# Patient Record
Sex: FEMALE | Race: WHITE | NOT HISPANIC OR LATINO | URBAN - METROPOLITAN AREA
[De-identification: names, ages, dates, MRNs, and addresses within clinical notes are randomized per-mention and may not be internally consistent; named-entity substitution may affect disease eponyms.]

---

## 2023-07-06 ENCOUNTER — OFFICE VISIT (OUTPATIENT)
Dept: PRIMARY CARE | Facility: CLINIC | Age: 43
End: 2023-07-06
Payer: COMMERCIAL

## 2023-07-06 VITALS
HEIGHT: 66 IN | BODY MASS INDEX: 30.02 KG/M2 | WEIGHT: 186.8 LBS | TEMPERATURE: 97.7 F | SYSTOLIC BLOOD PRESSURE: 110 MMHG | HEART RATE: 74 BPM | DIASTOLIC BLOOD PRESSURE: 74 MMHG

## 2023-07-06 DIAGNOSIS — S90.829D BLISTER OF FOOT, UNSPECIFIED LATERALITY, SUBSEQUENT ENCOUNTER: ICD-10-CM

## 2023-07-06 DIAGNOSIS — M72.2 PLANTAR FASCIITIS, BILATERAL: ICD-10-CM

## 2023-07-06 DIAGNOSIS — Q82.8: Primary | ICD-10-CM

## 2023-07-06 PROCEDURE — 99213 OFFICE O/P EST LOW 20 MIN: CPT | Performed by: STUDENT IN AN ORGANIZED HEALTH CARE EDUCATION/TRAINING PROGRAM

## 2023-07-06 PROCEDURE — 1036F TOBACCO NON-USER: CPT | Performed by: STUDENT IN AN ORGANIZED HEALTH CARE EDUCATION/TRAINING PROGRAM

## 2023-07-06 RX ORDER — HYDROXYCHLOROQUINE SULFATE 200 MG/1
200 TABLET, FILM COATED ORAL 2 TIMES DAILY
COMMUNITY
Start: 2019-04-11

## 2023-07-06 RX ORDER — MULTIVITAMIN
1 TABLET ORAL DAILY
COMMUNITY
Start: 2023-05-01

## 2023-07-06 RX ORDER — BACITRACIN ZINC 500 UNIT/G
OINTMENT (GRAM) TOPICAL 2 TIMES DAILY
Qty: 113 G | Refills: 2 | Status: SHIPPED | OUTPATIENT
Start: 2023-07-06

## 2023-07-06 RX ORDER — VIT C/E/ZN/COPPR/LUTEIN/ZEAXAN 250MG-90MG
1 CAPSULE ORAL DAILY
COMMUNITY
Start: 2023-01-03

## 2023-07-06 ASSESSMENT — PATIENT HEALTH QUESTIONNAIRE - PHQ9
2. FEELING DOWN, DEPRESSED OR HOPELESS: NOT AT ALL
1. LITTLE INTEREST OR PLEASURE IN DOING THINGS: NOT AT ALL
SUM OF ALL RESPONSES TO PHQ9 QUESTIONS 1 AND 2: 0

## 2023-07-06 ASSESSMENT — ENCOUNTER SYMPTOMS
DIZZINESS: 0
DYSURIA: 0
VOMITING: 0
HEADACHES: 0
FATIGUE: 0
ABDOMINAL PAIN: 0
CHILLS: 0
WOUND: 1
LIGHT-HEADEDNESS: 0
ARTHRALGIAS: 0
CONSTIPATION: 0
FREQUENCY: 0
MYALGIAS: 0
SHORTNESS OF BREATH: 0
RHINORRHEA: 0
NAUSEA: 0
DIARRHEA: 0
FEVER: 0

## 2023-07-06 NOTE — PROGRESS NOTES
"Subjective   Patient ID: Gertrudis Kingsley is a 42 y.o. female who presents for blisters on feet.    HPI     She has Unna-Thost syndrome.  She gets blisters around the calluses on her hands and feet.  She has had areas on the feet with blisters over the last week.  She did pop them but over the next few days they have gotten more painful and more of the blisters formed.  She is concerned about infection.    Review of Systems   Constitutional:  Negative for chills, fatigue and fever.   HENT:  Negative for congestion and rhinorrhea.    Respiratory:  Negative for shortness of breath.    Cardiovascular:  Negative for chest pain.   Gastrointestinal:  Negative for abdominal pain, constipation, diarrhea, nausea and vomiting.   Genitourinary:  Negative for dysuria and frequency.   Musculoskeletal:  Negative for arthralgias and myalgias.   Skin:  Positive for wound (blisters on both feet).   Neurological:  Negative for dizziness, light-headedness and headaches.       Objective   /74   Pulse 74   Temp 36.5 °C (97.7 °F)   Ht 1.67 m (5' 5.75\")   Wt 84.7 kg (186 lb 12.8 oz)   BMI 30.38 kg/m²     Physical Exam  Vitals and nursing note reviewed.   Constitutional:       General: She is not in acute distress.     Appearance: Normal appearance. She is obese. She is not ill-appearing or toxic-appearing.   Cardiovascular:      Rate and Rhythm: Normal rate and regular rhythm.      Heart sounds: Normal heart sounds.   Pulmonary:      Effort: Pulmonary effort is normal.      Breath sounds: Normal breath sounds.   Skin:     General: Skin is warm and dry.      Findings: Lesion (blisters on bilateral feet) present. No erythema.      Comments: No surrounding erythema.  No discharge or drainage or other signs of infection.   Neurological:      Mental Status: She is alert.         Assessment/Plan   Problem List Items Addressed This Visit    None  Visit Diagnoses       Unna-Thost syndrome    -  Primary    Relevant Medications    " bacitracin 500 unit/gram ointment    Blister of foot, unspecified laterality, subsequent encounter        Relevant Medications    bacitracin 500 unit/gram ointment    Plantar fasciitis, bilateral              History and physical examination as above.  No signs of infection around the areas of blistering.  Did prescribe bacitracin ointment to put over areas that are open to prevent them from getting infected.  Discussed signs and symptoms to watch for that would indicate need for additional treatment.  She is understanding and agreeable with this plan.

## 2024-01-23 ENCOUNTER — TELEPHONE (OUTPATIENT)
Dept: PRIMARY CARE | Facility: CLINIC | Age: 44
End: 2024-01-23
Payer: COMMERCIAL

## 2024-01-23 DIAGNOSIS — R53.83 OTHER FATIGUE: ICD-10-CM

## 2024-01-23 DIAGNOSIS — Z00.00 HEALTHCARE MAINTENANCE: ICD-10-CM

## 2024-01-23 NOTE — TELEPHONE ENCOUNTER
Pt coming in on 02/26/24 for annual physical  BW?    PT will go downstairs for BW.    Pt number [phone]

## 2024-02-07 ENCOUNTER — APPOINTMENT (OUTPATIENT)
Dept: LAB | Facility: LAB | Age: 44
End: 2024-02-07
Payer: COMMERCIAL

## 2024-02-07 ENCOUNTER — LAB (OUTPATIENT)
Dept: LAB | Facility: LAB | Age: 44
End: 2024-02-07
Payer: COMMERCIAL

## 2024-02-07 DIAGNOSIS — R53.83 OTHER FATIGUE: ICD-10-CM

## 2024-02-07 DIAGNOSIS — Z00.00 HEALTHCARE MAINTENANCE: ICD-10-CM

## 2024-02-07 PROCEDURE — 84443 ASSAY THYROID STIM HORMONE: CPT

## 2024-02-07 PROCEDURE — 80053 COMPREHEN METABOLIC PANEL: CPT

## 2024-02-07 PROCEDURE — 36415 COLL VENOUS BLD VENIPUNCTURE: CPT

## 2024-02-07 PROCEDURE — 85025 COMPLETE CBC W/AUTO DIFF WBC: CPT

## 2024-02-07 PROCEDURE — 80061 LIPID PANEL: CPT

## 2024-02-07 PROCEDURE — 82306 VITAMIN D 25 HYDROXY: CPT

## 2024-02-08 LAB
25(OH)D3 SERPL-MCNC: 60 NG/ML (ref 30–100)
ALBUMIN SERPL BCP-MCNC: 4.1 G/DL (ref 3.4–5)
ALP SERPL-CCNC: 39 U/L (ref 33–110)
ALT SERPL W P-5'-P-CCNC: 12 U/L (ref 7–45)
ANION GAP SERPL CALC-SCNC: 10 MMOL/L (ref 10–20)
AST SERPL W P-5'-P-CCNC: 38 U/L (ref 9–39)
BASOPHILS # BLD AUTO: 0.04 X10*3/UL (ref 0–0.1)
BASOPHILS NFR BLD AUTO: 0.6 %
BILIRUB SERPL-MCNC: 0.5 MG/DL (ref 0–1.2)
BUN SERPL-MCNC: 8 MG/DL (ref 6–23)
CALCIUM SERPL-MCNC: 9.2 MG/DL (ref 8.6–10.6)
CHLORIDE SERPL-SCNC: 104 MMOL/L (ref 98–107)
CHOLEST SERPL-MCNC: 162 MG/DL (ref 0–199)
CHOLESTEROL/HDL RATIO: 3.3
CO2 SERPL-SCNC: 28 MMOL/L (ref 21–32)
CREAT SERPL-MCNC: 0.68 MG/DL (ref 0.5–1.05)
EGFRCR SERPLBLD CKD-EPI 2021: >90 ML/MIN/1.73M*2
EOSINOPHIL # BLD AUTO: 0.09 X10*3/UL (ref 0–0.7)
EOSINOPHIL NFR BLD AUTO: 1.3 %
ERYTHROCYTE [DISTWIDTH] IN BLOOD BY AUTOMATED COUNT: 12.9 % (ref 11.5–14.5)
GLUCOSE SERPL-MCNC: 79 MG/DL (ref 74–99)
HCT VFR BLD AUTO: 39.3 % (ref 36–46)
HDLC SERPL-MCNC: 48.7 MG/DL
HGB BLD-MCNC: 12.2 G/DL (ref 12–16)
IMM GRANULOCYTES # BLD AUTO: 0.01 X10*3/UL (ref 0–0.7)
IMM GRANULOCYTES NFR BLD AUTO: 0.1 % (ref 0–0.9)
LDLC SERPL CALC-MCNC: 93 MG/DL
LYMPHOCYTES # BLD AUTO: 3.27 X10*3/UL (ref 1.2–4.8)
LYMPHOCYTES NFR BLD AUTO: 45.9 %
MCH RBC QN AUTO: 29.8 PG (ref 26–34)
MCHC RBC AUTO-ENTMCNC: 31 G/DL (ref 32–36)
MCV RBC AUTO: 96 FL (ref 80–100)
MONOCYTES # BLD AUTO: 0.34 X10*3/UL (ref 0.1–1)
MONOCYTES NFR BLD AUTO: 4.8 %
NEUTROPHILS # BLD AUTO: 3.38 X10*3/UL (ref 1.2–7.7)
NEUTROPHILS NFR BLD AUTO: 47.3 %
NON HDL CHOLESTEROL: 113 MG/DL (ref 0–149)
NRBC BLD-RTO: 0 /100 WBCS (ref 0–0)
PLATELET # BLD AUTO: 232 X10*3/UL (ref 150–450)
POTASSIUM SERPL-SCNC: 5.3 MMOL/L (ref 3.5–5.3)
PROT SERPL-MCNC: 6.9 G/DL (ref 6.4–8.2)
RBC # BLD AUTO: 4.1 X10*6/UL (ref 4–5.2)
SODIUM SERPL-SCNC: 137 MMOL/L (ref 136–145)
TRIGL SERPL-MCNC: 103 MG/DL (ref 0–149)
TSH SERPL-ACNC: 1.45 MIU/L (ref 0.44–3.98)
VLDL: 21 MG/DL (ref 0–40)
WBC # BLD AUTO: 7.1 X10*3/UL (ref 4.4–11.3)

## 2024-02-26 ENCOUNTER — OFFICE VISIT (OUTPATIENT)
Dept: PRIMARY CARE | Facility: CLINIC | Age: 44
End: 2024-02-26
Payer: COMMERCIAL

## 2024-02-26 VITALS
BODY MASS INDEX: 28.93 KG/M2 | HEIGHT: 66 IN | WEIGHT: 180 LBS | OXYGEN SATURATION: 97 % | SYSTOLIC BLOOD PRESSURE: 99 MMHG | DIASTOLIC BLOOD PRESSURE: 66 MMHG | HEART RATE: 68 BPM | TEMPERATURE: 98.1 F

## 2024-02-26 DIAGNOSIS — Z00.00 HEALTHCARE MAINTENANCE: Primary | ICD-10-CM

## 2024-02-26 DIAGNOSIS — I51.89 DIASTOLIC DYSFUNCTION: ICD-10-CM

## 2024-02-26 DIAGNOSIS — D69.3 CHRONIC ITP (IDIOPATHIC THROMBOCYTOPENIA) (MULTI): ICD-10-CM

## 2024-02-26 PROBLEM — R20.2 PARESTHESIA: Status: ACTIVE | Noted: 2024-02-26

## 2024-02-26 PROBLEM — G62.9 NEUROPATHY: Status: ACTIVE | Noted: 2024-02-26

## 2024-02-26 PROBLEM — S90.829A BLISTER OF FOOT: Status: ACTIVE | Noted: 2024-02-26

## 2024-02-26 PROBLEM — M35.9 UNDIFFERENTIATED CONNECTIVE TISSUE DISEASE (MULTI): Status: ACTIVE | Noted: 2019-03-13

## 2024-02-26 PROBLEM — R42 DIZZINESS: Status: ACTIVE | Noted: 2024-02-26

## 2024-02-26 PROBLEM — O09.529 ANTEPARTUM MULTIGRAVIDA OF ADVANCED MATERNAL AGE (HHS-HCC): Status: ACTIVE | Noted: 2020-11-26

## 2024-02-26 PROBLEM — O09.40: Status: ACTIVE | Noted: 2020-11-26

## 2024-02-26 PROBLEM — M72.2 PLANTAR FASCIITIS, BILATERAL: Status: ACTIVE | Noted: 2024-02-26

## 2024-02-26 PROBLEM — O46.93 VAGINAL BLEEDING IN PREGNANCY, THIRD TRIMESTER (HHS-HCC): Status: ACTIVE | Noted: 2021-05-06

## 2024-02-26 PROBLEM — H61.20 CERUMEN IMPACTION: Status: ACTIVE | Noted: 2024-02-26

## 2024-02-26 PROBLEM — N61.0 MASTITIS: Status: ACTIVE | Noted: 2024-02-26

## 2024-02-26 PROBLEM — O00.109 ECTOPIC PREGNANCY, TUBAL (HHS-HCC): Status: ACTIVE | Noted: 2021-05-06

## 2024-02-26 PROBLEM — Z86.2 HISTORY OF IMMUNE THROMBOCYTOPENIA: Status: ACTIVE | Noted: 2024-02-26

## 2024-02-26 PROCEDURE — 1036F TOBACCO NON-USER: CPT | Performed by: FAMILY MEDICINE

## 2024-02-26 PROCEDURE — 99396 PREV VISIT EST AGE 40-64: CPT | Performed by: FAMILY MEDICINE

## 2024-02-26 ASSESSMENT — ENCOUNTER SYMPTOMS
COUGH: 0
FEVER: 0
PALPITATIONS: 0
FATIGUE: 0
ACTIVITY CHANGE: 0
APPETITE CHANGE: 0
FACIAL ASYMMETRY: 0
COLOR CHANGE: 0
CHEST TIGHTNESS: 0
LIGHT-HEADEDNESS: 0
DIZZINESS: 0
BACK PAIN: 0
CHOKING: 0
ARTHRALGIAS: 0
HEADACHES: 0

## 2024-02-26 ASSESSMENT — PATIENT HEALTH QUESTIONNAIRE - PHQ9
1. LITTLE INTEREST OR PLEASURE IN DOING THINGS: NOT AT ALL
SUM OF ALL RESPONSES TO PHQ9 QUESTIONS 1 AND 2: 0
2. FEELING DOWN, DEPRESSED OR HOPELESS: NOT AT ALL

## 2024-02-26 NOTE — PROGRESS NOTES
"Subjective   Patient ID: Gertrudis Kingsley is a 43 y.o. female who presents for Annual Exam.    HPI   Patient presents for physical exam.     Fam Hx  Mom ()  Dad ()     Exercise walks, bike rides in summer  ETOH denies  Caffeine denies  Tobacco denies     ob/gyn dr. black, NP at [facility]     Mammogram due 2024  DEXA @ 67  Colonoscopy @ 47     Patient denies other complaints.   Review of Systems   Constitutional:  Negative for activity change, appetite change, fatigue and fever.   HENT:  Negative for congestion.    Respiratory:  Negative for cough, choking and chest tightness.    Cardiovascular:  Negative for chest pain, palpitations and leg swelling.   Musculoskeletal:  Negative for arthralgias, back pain and gait problem.   Skin:  Negative for color change and pallor.   Neurological:  Negative for dizziness, facial asymmetry, light-headedness and headaches.       Objective   BP 99/66   Pulse 68   Temp 36.7 °C (98.1 °F)   Ht 1.676 m (5' 6\")   Wt 81.6 kg (180 lb)   SpO2 97%   BMI 29.05 kg/m²   BSA Body surface area is 1.95 meters squared.      Physical Exam  Constitutional:       General: She is not in acute distress.     Appearance: Normal appearance. She is not toxic-appearing.   HENT:      Head: Normocephalic.      Right Ear: Tympanic membrane, ear canal and external ear normal.      Left Ear: Tympanic membrane, ear canal and external ear normal.   Eyes:      Conjunctiva/sclera: Conjunctivae normal.      Pupils: Pupils are equal, round, and reactive to light.   Cardiovascular:      Rate and Rhythm: Normal rate and regular rhythm.      Pulses: Normal pulses.      Heart sounds: Normal heart sounds.   Pulmonary:      Effort: No respiratory distress.      Breath sounds: No wheezing, rhonchi or rales.   Abdominal:      General: Bowel sounds are normal. There is no distension.      Palpations: Abdomen is soft.      Tenderness: There is no abdominal tenderness.   Musculoskeletal:         General: No swelling or " tenderness.   Skin:     Findings: No lesion or rash.   Neurological:      General: No focal deficit present.      Mental Status: She is alert and oriented to person, place, and time. Mental status is at baseline.      Gait: Gait normal.   Psychiatric:         Mood and Affect: Mood normal.         Behavior: Behavior normal.         Thought Content: Thought content normal.         Judgment: Judgment normal.       Lab on 02/07/2024   Component Date Value Ref Range Status    WBC 02/07/2024 7.1  4.4 - 11.3 x10*3/uL Final    nRBC 02/07/2024 0.0  0.0 - 0.0 /100 WBCs Final    RBC 02/07/2024 4.10  4.00 - 5.20 x10*6/uL Final    Hemoglobin 02/07/2024 12.2  12.0 - 16.0 g/dL Final    Hematocrit 02/07/2024 39.3  36.0 - 46.0 % Final    MCV 02/07/2024 96  80 - 100 fL Final    MCH 02/07/2024 29.8  26.0 - 34.0 pg Final    MCHC 02/07/2024 31.0 (L)  32.0 - 36.0 g/dL Final    RDW 02/07/2024 12.9  11.5 - 14.5 % Final    Platelets 02/07/2024 232  150 - 450 x10*3/uL Final    Neutrophils % 02/07/2024 47.3  40.0 - 80.0 % Final    Immature Granulocytes %, Automated 02/07/2024 0.1  0.0 - 0.9 % Final    Immature Granulocyte Count (IG) includes promyelocytes, myelocytes and metamyelocytes but does not include bands. Percent differential counts (%) should be interpreted in the context of the absolute cell counts (cells/UL).    Lymphocytes % 02/07/2024 45.9  13.0 - 44.0 % Final    Monocytes % 02/07/2024 4.8  2.0 - 10.0 % Final    Eosinophils % 02/07/2024 1.3  0.0 - 6.0 % Final    Basophils % 02/07/2024 0.6  0.0 - 2.0 % Final    Neutrophils Absolute 02/07/2024 3.38  1.20 - 7.70 x10*3/uL Final    Percent differential counts (%) should be interpreted in the context of the absolute cell counts (cells/uL).    Immature Granulocytes Absolute, Au* 02/07/2024 0.01  0.00 - 0.70 x10*3/uL Final    Lymphocytes Absolute 02/07/2024 3.27  1.20 - 4.80 x10*3/uL Final    Monocytes Absolute 02/07/2024 0.34  0.10 - 1.00 x10*3/uL Final    Eosinophils Absolute  02/07/2024 0.09  0.00 - 0.70 x10*3/uL Final    Basophils Absolute 02/07/2024 0.04  0.00 - 0.10 x10*3/uL Final    Glucose 02/07/2024 79  74 - 99 mg/dL Final    Sodium 02/07/2024 137  136 - 145 mmol/L Final    Potassium 02/07/2024 5.3  3.5 - 5.3 mmol/L Final    MARKED HEMOLYSIS DETECTED. The result may be falsely elevated due to hemolysis or other interferents. Clinical correlation is recommended. Repeat testing may be considered.    Chloride 02/07/2024 104  98 - 107 mmol/L Final    Bicarbonate 02/07/2024 28  21 - 32 mmol/L Final    Anion Gap 02/07/2024 10  10 - 20 mmol/L Final    Urea Nitrogen 02/07/2024 8  6 - 23 mg/dL Final    Creatinine 02/07/2024 0.68  0.50 - 1.05 mg/dL Final    eGFR 02/07/2024 >90  >60 mL/min/1.73m*2 Final    Calculations of estimated GFR are performed using the 2021 CKD-EPI Study Refit equation without the race variable for the IDMS-Traceable creatinine methods.  https://jasn.asnjournals.org/content/early/2021/09/22/ASN.3629522742    Calcium 02/07/2024 9.2  8.6 - 10.6 mg/dL Final    Albumin 02/07/2024 4.1  3.4 - 5.0 g/dL Final    MARKED HEMOLYSIS DETECTED. The result may be falsely elevated due to hemolysis or other interferents. Clinical correlation is recommended. Repeat testing may be considered.    Alkaline Phosphatase 02/07/2024 39  33 - 110 U/L Final    MARKED HEMOLYSIS DETECTED. The result may be falsely decreased due to hemolysis or other interferents. Clinical correlation is recommended. Repeat testing may be considered.    Total Protein 02/07/2024 6.9  6.4 - 8.2 g/dL Final    AST 02/07/2024 38  9 - 39 U/L Final    MARKED HEMOLYSIS DETECTED. The result may be falsely elevated due to hemolysis or other interferents. Clinical correlation is recommended. Repeat testing may be considered.    Bilirubin, Total 02/07/2024 0.5  0.0 - 1.2 mg/dL Final    ALT 02/07/2024 12  7 - 45 U/L Final    Patients treated with Sulfasalazine may generate falsely decreased results for ALT.    Cholesterol  02/07/2024 162  0 - 199 mg/dL Final          Age      Desirable   Borderline High   High     0-19 Y     0 - 169       170 - 199     >/= 200    20-24 Y     0 - 189       190 - 224     >/= 225         >24 Y     0 - 199       200 - 239     >/= 240   **All ranges are based on fasting samples. Specific   therapeutic targets will vary based on patient-specific   cardiac risk.    Pediatric guidelines reference:Pediatrics 2011, 128(S5).Adult guidelines reference: NCEP ATPIII Guidelines,JOCELYNE 2001, 258:2486-97    Venipuncture immediately after or during the administration of Metamizole may lead to falsely low results. Testing should be performed immediately prior to Metamizole dosing.    HDL-Cholesterol 02/07/2024 48.7  mg/dL Final      Age       Very Low   Low     Normal    High    0-19 Y    < 35      < 40     40-45     ----  20-24 Y    ----     < 40      >45      ----        >24 Y      ----     < 40     40-60      >60      Cholesterol/HDL Ratio 02/07/2024 3.3   Final      Ref Values  Desirable  < 3.4  High Risk  > 5.0    LDL Calculated 02/07/2024 93  <=99 mg/dL Final                                Near   Borderline      AGE      Desirable  Optimal    High     High     Very High     0-19 Y     0 - 109     ---    110-129   >/= 130     ----    20-24 Y     0 - 119     ---    120-159   >/= 160     ----      >24 Y     0 -  99   100-129  130-159   160-189     >/=190      VLDL 02/07/2024 21  0 - 40 mg/dL Final    Triglycerides 02/07/2024 103  0 - 149 mg/dL Final       Age         Desirable   Borderline High   High     Very High   0 D-90 D    19 - 174         ----         ----        ----  91 D- 9 Y     0 -  74        75 -  99     >/= 100      ----    10-19 Y     0 -  89        90 - 129     >/= 130      ----    20-24 Y     0 - 114       115 - 149     >/= 150      ----         >24 Y     0 - 149       150 - 199    200- 499    >/= 500    Venipuncture immediately after or during the administration of Metamizole may lead to falsely low  results. Testing should be performed immediately prior to Metamizole dosing.    Non HDL Cholesterol 02/07/2024 113  0 - 149 mg/dL Final          Age       Desirable   Borderline High   High     Very High     0-19 Y     0 - 119       120 - 144     >/= 145    >/= 160    20-24 Y     0 - 149       150 - 189     >/= 190      ----         >24 Y    30 mg/dL above LDL Cholesterol goal      Thyroid Stimulating Hormone 02/07/2024 1.45  0.44 - 3.98 mIU/L Final    Vitamin D, 25-Hydroxy, Total 02/07/2024 60  30 - 100 ng/mL Final   [facility] Encounter on 05/01/2023   Component Date Value Ref Range Status    Ventricular Rate 05/01/2023 56  BPM Final    Atrial Rate 05/01/2023 56  BPM Final    NH Interval 05/01/2023 150  ms Final    QRS Duration 05/01/2023 98  ms Final    QT Interval 05/01/2023 426  ms Final    QTC Calculation(Bazett) 05/01/2023 411  ms Final    P Axis 05/01/2023 41  degrees Final    R Axis 05/01/2023 -36  degrees Final    T Axis 05/01/2023 18  degrees Final    QRS Count 05/01/2023 9  beats Final    Q Onset 05/01/2023 208  ms Final    P Onset 05/01/2023 133  ms Final    P Offset 05/01/2023 185  ms Final    T Offset 05/01/2023 421  ms Final    QTC Fredericia 05/01/2023 416  ms Final     Current Outpatient Medications on File Prior to Visit   Medication Sig Dispense Refill    cholecalciferol (Vitamin D-3) 25 MCG (1000 UT) capsule Take 1 capsule (25 mcg) by mouth once daily.      hydroxychloroquine (Plaquenil) 200 mg tablet Take 1 tablet (200 mg) by mouth 2 times a day.      multivitamin with folic acid (One Daily Multivitamin) 400 mcg tablet Take 1 tablet by mouth once daily.      bacitracin 500 unit/gram ointment Apply topically 2 times a day. (Patient not taking: Reported on 2/26/2024) 113 g 2     No current facility-administered medications on file prior to visit.     No images are attached to the encounter.        Assessment/Plan   Diagnoses and all orders for this visit:  Healthcare maintenance  Diastolic  dysfunction  Chronic ITP (idiopathic thrombocytopenia) (CMS/[facility])    1. Patient's blood work discussed at this office visit  2. Patient's LDL goal less than 130 current LDL 93  3. Mammogram due 2024  4. DEXA @ 67  5. Colonoscopy @ 47  6. Patient to call if questions or concerns

## 2024-12-05 ENCOUNTER — TELEPHONE (OUTPATIENT)
Dept: PRIMARY CARE | Facility: CLINIC | Age: 44
End: 2024-12-05

## 2024-12-05 DIAGNOSIS — Z12.31 ENCOUNTER FOR SCREENING MAMMOGRAM FOR BREAST CANCER: ICD-10-CM

## 2024-12-05 NOTE — TELEPHONE ENCOUNTER
Patient called requesting orders for a mammogram can you place that in chart and call when it is ready to be scheduled please

## 2025-01-03 ENCOUNTER — HOSPITAL ENCOUNTER (OUTPATIENT)
Dept: RADIOLOGY | Facility: CLINIC | Age: 45
Discharge: HOME | End: 2025-01-03
Payer: COMMERCIAL

## 2025-01-03 VITALS — WEIGHT: 190 LBS | HEIGHT: 66 IN | BODY MASS INDEX: 30.53 KG/M2

## 2025-01-03 DIAGNOSIS — Z12.31 ENCOUNTER FOR SCREENING MAMMOGRAM FOR BREAST CANCER: ICD-10-CM

## 2025-01-03 PROCEDURE — 77063 BREAST TOMOSYNTHESIS BI: CPT

## 2025-02-26 ENCOUNTER — APPOINTMENT (OUTPATIENT)
Dept: PRIMARY CARE | Facility: CLINIC | Age: 45
End: 2025-02-26
Payer: COMMERCIAL

## 2025-02-26 VITALS
HEART RATE: 78 BPM | OXYGEN SATURATION: 98 % | WEIGHT: 179.2 LBS | HEIGHT: 66 IN | DIASTOLIC BLOOD PRESSURE: 66 MMHG | BODY MASS INDEX: 28.8 KG/M2 | SYSTOLIC BLOOD PRESSURE: 108 MMHG

## 2025-02-26 DIAGNOSIS — D69.3 CHRONIC ITP (IDIOPATHIC THROMBOCYTOPENIA) (MULTI): ICD-10-CM

## 2025-02-26 DIAGNOSIS — M35.9 UNDIFFERENTIATED CONNECTIVE TISSUE DISEASE (MULTI): ICD-10-CM

## 2025-02-26 DIAGNOSIS — R53.83 OTHER FATIGUE: ICD-10-CM

## 2025-02-26 DIAGNOSIS — I51.89 DIASTOLIC DYSFUNCTION: ICD-10-CM

## 2025-02-26 DIAGNOSIS — G62.9 NEUROPATHY: ICD-10-CM

## 2025-02-26 DIAGNOSIS — Z86.2 HISTORY OF IMMUNE THROMBOCYTOPENIA: ICD-10-CM

## 2025-02-26 DIAGNOSIS — Z00.00 HEALTHCARE MAINTENANCE: Primary | ICD-10-CM

## 2025-02-26 DIAGNOSIS — Z12.4 SCREENING FOR CERVICAL CANCER: ICD-10-CM

## 2025-02-26 PROBLEM — O09.40: Status: RESOLVED | Noted: 2020-11-26 | Resolved: 2025-02-26

## 2025-02-26 PROBLEM — S90.829A BLISTER OF FOOT: Status: RESOLVED | Noted: 2024-02-26 | Resolved: 2025-02-26

## 2025-02-26 PROBLEM — N61.0 MASTITIS: Status: RESOLVED | Noted: 2024-02-26 | Resolved: 2025-02-26

## 2025-02-26 PROBLEM — O00.109 ECTOPIC PREGNANCY, TUBAL (HHS-HCC): Status: RESOLVED | Noted: 2021-05-06 | Resolved: 2025-02-26

## 2025-02-26 PROBLEM — O46.93 VAGINAL BLEEDING IN PREGNANCY, THIRD TRIMESTER (HHS-HCC): Status: RESOLVED | Noted: 2021-05-06 | Resolved: 2025-02-26

## 2025-02-26 PROBLEM — O09.529 ANTEPARTUM MULTIGRAVIDA OF ADVANCED MATERNAL AGE (HHS-HCC): Status: RESOLVED | Noted: 2020-11-26 | Resolved: 2025-02-26

## 2025-02-26 PROCEDURE — 99396 PREV VISIT EST AGE 40-64: CPT | Performed by: FAMILY MEDICINE

## 2025-02-26 PROCEDURE — 1036F TOBACCO NON-USER: CPT | Performed by: FAMILY MEDICINE

## 2025-02-26 PROCEDURE — 87624 HPV HI-RISK TYP POOLED RSLT: CPT

## 2025-02-26 PROCEDURE — 88175 CYTOPATH C/V AUTO FLUID REDO: CPT

## 2025-02-26 PROCEDURE — 3008F BODY MASS INDEX DOCD: CPT | Performed by: FAMILY MEDICINE

## 2025-02-26 ASSESSMENT — ENCOUNTER SYMPTOMS
COUGH: 0
HEADACHES: 0
LIGHT-HEADEDNESS: 0
FACIAL ASYMMETRY: 0
ACTIVITY CHANGE: 0
ARTHRALGIAS: 0
BACK PAIN: 0
CHOKING: 0
DEPRESSION: 0
FATIGUE: 0
LOSS OF SENSATION IN FEET: 0
COLOR CHANGE: 0
PALPITATIONS: 0
DIZZINESS: 0
FEVER: 0
CHEST TIGHTNESS: 0
APPETITE CHANGE: 0
OCCASIONAL FEELINGS OF UNSTEADINESS: 0

## 2025-02-26 ASSESSMENT — PATIENT HEALTH QUESTIONNAIRE - PHQ9
1. LITTLE INTEREST OR PLEASURE IN DOING THINGS: NOT AT ALL
2. FEELING DOWN, DEPRESSED OR HOPELESS: NOT AT ALL
10. IF YOU CHECKED OFF ANY PROBLEMS, HOW DIFFICULT HAVE THESE PROBLEMS MADE IT FOR YOU TO DO YOUR WORK, TAKE CARE OF THINGS AT HOME, OR GET ALONG WITH OTHER PEOPLE: NOT DIFFICULT AT ALL
SUM OF ALL RESPONSES TO PHQ9 QUESTIONS 1 AND 2: 0

## 2025-02-26 NOTE — PROGRESS NOTES
"Subjective   Patient ID: Gertrudis Kingsley is a 44 y.o. female who presents for Annual Exam (With pap).    HPI   Patient presents for physical exam.     Fam Hx  Mom ()  Dad ()     Exercise walks, bike rides in summer  ETOH denies  Caffeine denies  Tobacco denies     Mammogram due   DEXA @ 67  Colonoscopy @ 47     Patient denies other complaints.   Review of Systems   Constitutional:  Negative for activity change, appetite change, fatigue and fever.   HENT:  Negative for congestion.    Respiratory:  Negative for cough, choking and chest tightness.    Cardiovascular:  Negative for chest pain, palpitations and leg swelling.   Musculoskeletal:  Negative for arthralgias, back pain and gait problem.   Skin:  Negative for color change and pallor.   Neurological:  Negative for dizziness, facial asymmetry, light-headedness and headaches.       Objective   /66 (BP Location: Left arm, Patient Position: Sitting)   Pulse 78   Ht 1.664 m (5' 5.5\")   Wt 81.3 kg (179 lb 3.2 oz)   SpO2 98%   BMI 29.37 kg/m²   BSA Body surface area is 1.94 meters squared.      Physical Exam  Exam conducted with a chaperone present (torito raymundo present for  exam only).   Constitutional:       General: She is not in acute distress.     Appearance: Normal appearance. She is not toxic-appearing.   HENT:      Head: Normocephalic.      Right Ear: Tympanic membrane, ear canal and external ear normal.      Left Ear: Tympanic membrane, ear canal and external ear normal.   Eyes:      Conjunctiva/sclera: Conjunctivae normal.      Pupils: Pupils are equal, round, and reactive to light.   Cardiovascular:      Rate and Rhythm: Normal rate and regular rhythm.      Pulses: Normal pulses.      Heart sounds: Normal heart sounds.   Pulmonary:      Effort: No respiratory distress.      Breath sounds: No wheezing, rhonchi or rales.   Chest:      Chest wall: No mass.   Breasts:     Breasts are symmetrical.      Right: No swelling, bleeding, inverted " nipple, mass or nipple discharge.      Left: No swelling, bleeding, inverted nipple, mass or nipple discharge.   Abdominal:      General: Bowel sounds are normal. There is no distension.      Palpations: Abdomen is soft.      Tenderness: There is no abdominal tenderness.   Genitourinary:     General: Normal vulva.      Exam position: Lithotomy position.      Pubic Area: No rash.       Labia:         Right: No rash or tenderness.         Left: No rash or tenderness.       Urethra: Prolapse present.      Vagina: No signs of injury.      Cervix: No cervical motion tenderness, friability, erythema or eversion.      Uterus: Not deviated.       Adnexa: Left adnexa normal.        Right: No mass.          Left: No mass.        Comments: Dark blood noted around cervical os  Musculoskeletal:         General: No swelling or tenderness.   Lymphadenopathy:      Upper Body:      Right upper body: No supraclavicular adenopathy.      Left upper body: No supraclavicular adenopathy.   Skin:     Findings: No lesion or rash.   Neurological:      General: No focal deficit present.      Mental Status: She is alert and oriented to person, place, and time. Mental status is at baseline.      Gait: Gait normal.   Psychiatric:         Mood and Affect: Mood normal.         Behavior: Behavior normal.         Thought Content: Thought content normal.         Judgment: Judgment normal.       No visits with results within 1 Year(s) from this visit.   Latest known visit with results is:   Lab on 02/07/2024   Component Date Value Ref Range Status    WBC 02/07/2024 7.1  4.4 - 11.3 x10*3/uL Final    nRBC 02/07/2024 0.0  0.0 - 0.0 /100 WBCs Final    RBC 02/07/2024 4.10  4.00 - 5.20 x10*6/uL Final    Hemoglobin 02/07/2024 12.2  12.0 - 16.0 g/dL Final    Hematocrit 02/07/2024 39.3  36.0 - 46.0 % Final    MCV 02/07/2024 96  80 - 100 fL Final    MCH 02/07/2024 29.8  26.0 - 34.0 pg Final    MCHC 02/07/2024 31.0 (L)  32.0 - 36.0 g/dL Final    RDW 02/07/2024  12.9  11.5 - 14.5 % Final    Platelets 02/07/2024 232  150 - 450 x10*3/uL Final    Neutrophils % 02/07/2024 47.3  40.0 - 80.0 % Final    Immature Granulocytes %, Automated 02/07/2024 0.1  0.0 - 0.9 % Final    Immature Granulocyte Count (IG) includes promyelocytes, myelocytes and metamyelocytes but does not include bands. Percent differential counts (%) should be interpreted in the context of the absolute cell counts (cells/UL).    Lymphocytes % 02/07/2024 45.9  13.0 - 44.0 % Final    Monocytes % 02/07/2024 4.8  2.0 - 10.0 % Final    Eosinophils % 02/07/2024 1.3  0.0 - 6.0 % Final    Basophils % 02/07/2024 0.6  0.0 - 2.0 % Final    Neutrophils Absolute 02/07/2024 3.38  1.20 - 7.70 x10*3/uL Final    Percent differential counts (%) should be interpreted in the context of the absolute cell counts (cells/uL).    Immature Granulocytes Absolute, Au* 02/07/2024 0.01  0.00 - 0.70 x10*3/uL Final    Lymphocytes Absolute 02/07/2024 3.27  1.20 - 4.80 x10*3/uL Final    Monocytes Absolute 02/07/2024 0.34  0.10 - 1.00 x10*3/uL Final    Eosinophils Absolute 02/07/2024 0.09  0.00 - 0.70 x10*3/uL Final    Basophils Absolute 02/07/2024 0.04  0.00 - 0.10 x10*3/uL Final    Glucose 02/07/2024 79  74 - 99 mg/dL Final    Sodium 02/07/2024 137  136 - 145 mmol/L Final    Potassium 02/07/2024 5.3  3.5 - 5.3 mmol/L Final    MARKED HEMOLYSIS DETECTED. The result may be falsely elevated due to hemolysis or other interferents. Clinical correlation is recommended. Repeat testing may be considered.    Chloride 02/07/2024 104  98 - 107 mmol/L Final    Bicarbonate 02/07/2024 28  21 - 32 mmol/L Final    Anion Gap 02/07/2024 10  10 - 20 mmol/L Final    Urea Nitrogen 02/07/2024 8  6 - 23 mg/dL Final    Creatinine 02/07/2024 0.68  0.50 - 1.05 mg/dL Final    eGFR 02/07/2024 >90  >60 mL/min/1.73m*2 Final    Calculations of estimated GFR are performed using the 2021 CKD-EPI Study Refit equation without the race variable for the IDMS-Traceable creatinine  methods.  https://jasn.asnjournals.org/content/early/2021/09/22/ASN.2333968048    Calcium 02/07/2024 9.2  8.6 - 10.6 mg/dL Final    Albumin 02/07/2024 4.1  3.4 - 5.0 g/dL Final    MARKED HEMOLYSIS DETECTED. The result may be falsely elevated due to hemolysis or other interferents. Clinical correlation is recommended. Repeat testing may be considered.    Alkaline Phosphatase 02/07/2024 39  33 - 110 U/L Final    MARKED HEMOLYSIS DETECTED. The result may be falsely decreased due to hemolysis or other interferents. Clinical correlation is recommended. Repeat testing may be considered.    Total Protein 02/07/2024 6.9  6.4 - 8.2 g/dL Final    AST 02/07/2024 38  9 - 39 U/L Final    MARKED HEMOLYSIS DETECTED. The result may be falsely elevated due to hemolysis or other interferents. Clinical correlation is recommended. Repeat testing may be considered.    Bilirubin, Total 02/07/2024 0.5  0.0 - 1.2 mg/dL Final    ALT 02/07/2024 12  7 - 45 U/L Final    Patients treated with Sulfasalazine may generate falsely decreased results for ALT.    Cholesterol 02/07/2024 162  0 - 199 mg/dL Final          Age      Desirable   Borderline High   High     0-19 Y     0 - 169       170 - 199     >/= 200    20-24 Y     0 - 189       190 - 224     >/= 225         >24 Y     0 - 199       200 - 239     >/= 240   **All ranges are based on fasting samples. Specific   therapeutic targets will vary based on patient-specific   cardiac risk.    Pediatric guidelines reference:Pediatrics 2011, 128(S5).Adult guidelines reference: NCEP ATPIII Guidelines,JOCELYNE 2001, 258:2486-97    Venipuncture immediately after or during the administration of Metamizole may lead to falsely low results. Testing should be performed immediately prior to Metamizole dosing.    HDL-Cholesterol 02/07/2024 48.7  mg/dL Final      Age       Very Low   Low     Normal    High    0-19 Y    < 35      < 40     40-45     ----  20-24 Y    ----     < 40      >45      ----        >24 Y       ----     < 40     40-60      >60      Cholesterol/HDL Ratio 02/07/2024 3.3   Final      Ref Values  Desirable  < 3.4  High Risk  > 5.0    LDL Calculated 02/07/2024 93  <=99 mg/dL Final                                Near   Borderline      AGE      Desirable  Optimal    High     High     Very High     0-19 Y     0 - 109     ---    110-129   >/= 130     ----    20-24 Y     0 - 119     ---    120-159   >/= 160     ----      >24 Y     0 -  99   100-129  130-159   160-189     >/=190      VLDL 02/07/2024 21  0 - 40 mg/dL Final    Triglycerides 02/07/2024 103  0 - 149 mg/dL Final       Age         Desirable   Borderline High   High     Very High   0 D-90 D    19 - 174         ----         ----        ----  91 D- 9 Y     0 -  74        75 -  99     >/= 100      ----    10-19 Y     0 -  89        90 - 129     >/= 130      ----    20-24 Y     0 - 114       115 - 149     >/= 150      ----         >24 Y     0 - 149       150 - 199    200- 499    >/= 500    Venipuncture immediately after or during the administration of Metamizole may lead to falsely low results. Testing should be performed immediately prior to Metamizole dosing.    Non HDL Cholesterol 02/07/2024 113  0 - 149 mg/dL Final          Age       Desirable   Borderline High   High     Very High     0-19 Y     0 - 119       120 - 144     >/= 145    >/= 160    20-24 Y     0 - 149       150 - 189     >/= 190      ----         >24 Y    30 mg/dL above LDL Cholesterol goal      Thyroid Stimulating Hormone 02/07/2024 1.45  0.44 - 3.98 mIU/L Final    Vitamin D, 25-Hydroxy, Total 02/07/2024 60  30 - 100 ng/mL Final     Current Outpatient Medications on File Prior to Visit   Medication Sig Dispense Refill    cholecalciferol (Vitamin D-3) 25 MCG (1000 UT) capsule Take 1 capsule (25 mcg) by mouth once daily.      hydroxychloroquine (Plaquenil) 200 mg tablet Take 1 tablet (200 mg) by mouth 2 times a day.      multivitamin with folic acid (One Daily Multivitamin) 400 mcg tablet Take  1 tablet by mouth once daily.      [DISCONTINUED] bacitracin 500 unit/gram ointment Apply topically 2 times a day. (Patient not taking: Reported on 2/26/2025) 113 g 2     No current facility-administered medications on file prior to visit.     No images are attached to the encounter.        Assessment/Plan   Diagnoses and all orders for this visit:  Healthcare maintenance  -     CBC and Auto Differential; Future  -     Comprehensive Metabolic Panel; Future  -     Lipid Panel; Future  -     TSH with reflex to Free T4 if abnormal; Future  Diastolic dysfunction  History of immune thrombocytopenia  Chronic ITP (idiopathic thrombocytopenia) (Multi)  Undifferentiated connective tissue disease (Multi)  Other fatigue  -     Vitamin D 25-Hydroxy,Total (for eval of Vitamin D levels); Future  Screening for cervical cancer  -     THINPREP PAP TEST      1. Patient's blood work unavailable at this office visit  2. Patient's LDL goal less than 130 current LDL unavailable  3. Mammogram due   4. DEXA @ 67  5. Colonoscopy @ 47  6. Patient to call if questions or concerns

## 2025-02-28 ENCOUNTER — APPOINTMENT (OUTPATIENT)
Dept: PRIMARY CARE | Facility: CLINIC | Age: 45
End: 2025-02-28
Payer: COMMERCIAL

## 2025-03-13 LAB
CYTOLOGY CMNT CVX/VAG CYTO-IMP: NORMAL
HPV HR 12 DNA GENITAL QL NAA+PROBE: NEGATIVE
HPV HR GENOTYPES PNL CVX NAA+PROBE: NEGATIVE
HPV16 DNA SPEC QL NAA+PROBE: NEGATIVE
HPV18 DNA SPEC QL NAA+PROBE: NEGATIVE
LAB AP HPV GENOTYPE QUESTION: YES
LAB AP HPV HR: NORMAL
LABORATORY COMMENT REPORT: NORMAL
PATH REPORT.TOTAL CANCER: NORMAL